# Patient Record
Sex: FEMALE | Race: OTHER | Employment: OTHER | URBAN - METROPOLITAN AREA
[De-identification: names, ages, dates, MRNs, and addresses within clinical notes are randomized per-mention and may not be internally consistent; named-entity substitution may affect disease eponyms.]

---

## 2017-08-03 ENCOUNTER — POST-OP (OUTPATIENT)
Dept: URBAN - METROPOLITAN AREA CLINIC 43 | Facility: CLINIC | Age: 72
End: 2017-08-03

## 2017-08-03 ENCOUNTER — PREPPED CHART (OUTPATIENT)
Dept: URBAN - METROPOLITAN AREA CLINIC 43 | Facility: CLINIC | Age: 72
End: 2017-08-03

## 2017-08-03 DIAGNOSIS — Z94.7: ICD-10-CM

## 2017-08-03 PROCEDURE — 99024 POSTOP FOLLOW-UP VISIT: CPT

## 2017-08-03 ASSESSMENT — TONOMETRY: OS_IOP_MMHG: 10

## 2017-08-03 ASSESSMENT — VISUAL ACUITY
OS_SC: 20/200
OD_SC: 20/80-2

## 2017-08-28 ENCOUNTER — POST-OP (OUTPATIENT)
Dept: URBAN - METROPOLITAN AREA CLINIC 43 | Facility: CLINIC | Age: 72
End: 2017-08-28

## 2017-08-28 DIAGNOSIS — H35.352: ICD-10-CM

## 2017-08-28 DIAGNOSIS — H35.30: ICD-10-CM

## 2017-08-28 DIAGNOSIS — Z98.890: ICD-10-CM

## 2017-08-28 DIAGNOSIS — H35.373: ICD-10-CM

## 2017-08-28 DIAGNOSIS — H43.813: ICD-10-CM

## 2017-08-28 PROCEDURE — 99024 POSTOP FOLLOW-UP VISIT: CPT

## 2017-08-28 PROCEDURE — 92134 CPTRZ OPH DX IMG PST SGM RTA: CPT

## 2017-08-28 RX ORDER — FLUOROMETHOLONE 1 MG/ML
1 SUSPENSION/ DROPS OPHTHALMIC ONCE A DAY
Start: 2017-08-28

## 2017-08-28 RX ORDER — NEPAFENAC 3 MG/ML
1 SUSPENSION/ DROPS OPHTHALMIC ONCE A DAY
Start: 2017-08-28

## 2017-08-28 ASSESSMENT — VISUAL ACUITY
OS_SC: 20/200
OD_SC: 20/80-1

## 2017-08-28 ASSESSMENT — TONOMETRY
OD_IOP_MMHG: 8
OS_IOP_MMHG: 8

## 2017-09-20 ENCOUNTER — NEW PATIENT COMPREHENSIVE (OUTPATIENT)
Dept: URBAN - METROPOLITAN AREA CLINIC 43 | Facility: CLINIC | Age: 72
End: 2017-09-20

## 2017-09-20 DIAGNOSIS — H35.3131: ICD-10-CM

## 2017-09-20 DIAGNOSIS — H43.813: ICD-10-CM

## 2017-09-20 DIAGNOSIS — H35.352: ICD-10-CM

## 2017-09-20 DIAGNOSIS — E11.9: ICD-10-CM

## 2017-09-20 DIAGNOSIS — H35.373: ICD-10-CM

## 2017-09-20 PROCEDURE — 9222550 BILAT EXTENDED OPHTHALMOSCOPY, FIRST

## 2017-09-20 PROCEDURE — 92287 ANT SGM IMG IR FLRSCN ANGRPH: CPT

## 2017-09-20 PROCEDURE — 2022F DILAT RTA XM EVC RTNOPTHY: CPT

## 2017-09-20 PROCEDURE — G8785 BP SCRN NO PERF AT INTERVAL: HCPCS

## 2017-09-20 PROCEDURE — 92014 COMPRE OPH EXAM EST PT 1/>: CPT

## 2017-09-20 PROCEDURE — G8427 DOCREV CUR MEDS BY ELIG CLIN: HCPCS

## 2017-09-20 PROCEDURE — 3072F LOW RISK FOR RETINOPATHY: CPT

## 2017-09-20 PROCEDURE — 92134 CPTRZ OPH DX IMG PST SGM RTA: CPT

## 2017-09-20 PROCEDURE — 4177F TALK PT/CRGVR RE AREDS PREV: CPT

## 2017-09-20 PROCEDURE — 1036F TOBACCO NON-USER: CPT

## 2017-09-20 PROCEDURE — 2019F DILATED MACUL EXAM DONE: CPT

## 2017-09-20 RX ORDER — DUREZOL 0.5 MG/ML
1 EMULSION OPHTHALMIC TWICE A DAY
Start: 2017-09-20

## 2017-09-20 ASSESSMENT — VISUAL ACUITY
OS_CC: J5
OS_SC: 20/100
OD_CC: J5
OS_PH: 20/70-1
OD_SC: 20/70-1

## 2017-10-20 ENCOUNTER — ESTABLISHED PATIENT (OUTPATIENT)
Dept: URBAN - METROPOLITAN AREA CLINIC 43 | Facility: CLINIC | Age: 72
End: 2017-10-20

## 2017-10-20 DIAGNOSIS — E11.9: ICD-10-CM

## 2017-10-20 DIAGNOSIS — H35.3131: ICD-10-CM

## 2017-10-20 DIAGNOSIS — Z79.4: ICD-10-CM

## 2017-10-20 DIAGNOSIS — H35.373: ICD-10-CM

## 2017-10-20 DIAGNOSIS — H35.352: ICD-10-CM

## 2017-10-20 DIAGNOSIS — H43.813: ICD-10-CM

## 2017-10-20 PROCEDURE — G8428 CUR MEDS NOT DOCUMENT: HCPCS

## 2017-10-20 PROCEDURE — 3072F LOW RISK FOR RETINOPATHY: CPT

## 2017-10-20 PROCEDURE — 2019F DILATED MACUL EXAM DONE: CPT

## 2017-10-20 PROCEDURE — 4177F TALK PT/CRGVR RE AREDS PREV: CPT

## 2017-10-20 PROCEDURE — G8785 BP SCRN NO PERF AT INTERVAL: HCPCS

## 2017-10-20 PROCEDURE — 2022F DILAT RTA XM EVC RTNOPTHY: CPT

## 2017-10-20 PROCEDURE — 92134 CPTRZ OPH DX IMG PST SGM RTA: CPT

## 2017-10-20 PROCEDURE — 1036F TOBACCO NON-USER: CPT

## 2017-10-20 PROCEDURE — 92012 INTRM OPH EXAM EST PATIENT: CPT

## 2017-10-20 ASSESSMENT — VISUAL ACUITY
OD_SC: 20/70-2
OD_PH: 20/50
OS_SC: 20/100+1

## 2017-10-20 ASSESSMENT — TONOMETRY
OS_IOP_MMHG: 18
OD_IOP_MMHG: 16

## 2017-11-02 ENCOUNTER — ESTABLISHED PATIENT (OUTPATIENT)
Dept: URBAN - METROPOLITAN AREA CLINIC 43 | Facility: CLINIC | Age: 72
End: 2017-11-02

## 2017-11-02 DIAGNOSIS — H35.352: ICD-10-CM

## 2017-11-02 PROCEDURE — 67028 INJECTION EYE DRUG: CPT

## 2017-11-02 ASSESSMENT — TONOMETRY: OS_IOP_MMHG: 14

## 2017-11-02 ASSESSMENT — VISUAL ACUITY
OD_SC: 20/80-1
OS_SC: 20/100+1

## 2017-11-29 ENCOUNTER — ESTABLISHED PATIENT (OUTPATIENT)
Dept: URBAN - METROPOLITAN AREA CLINIC 43 | Facility: CLINIC | Age: 72
End: 2017-11-29

## 2017-11-29 DIAGNOSIS — H40.052: ICD-10-CM

## 2017-11-29 DIAGNOSIS — H35.352: ICD-10-CM

## 2017-11-29 PROCEDURE — G8785 BP SCRN NO PERF AT INTERVAL: HCPCS

## 2017-11-29 PROCEDURE — G8427 DOCREV CUR MEDS BY ELIG CLIN: HCPCS

## 2017-11-29 PROCEDURE — 1036F TOBACCO NON-USER: CPT

## 2017-11-29 PROCEDURE — 92012 INTRM OPH EXAM EST PATIENT: CPT

## 2017-11-29 ASSESSMENT — TONOMETRY
OD_IOP_MMHG: 09
OS_IOP_MMHG: 12

## 2017-11-29 ASSESSMENT — VISUAL ACUITY
OD_SC: 20/100
OS_PH: 20/50-1
OD_PH: 20/60
OS_SC: 20/70

## 2017-12-13 ENCOUNTER — ESTABLISHED PATIENT (OUTPATIENT)
Dept: URBAN - METROPOLITAN AREA CLINIC 43 | Facility: CLINIC | Age: 72
End: 2017-12-13

## 2017-12-13 DIAGNOSIS — H40.052: ICD-10-CM

## 2017-12-13 DIAGNOSIS — H35.373: ICD-10-CM

## 2017-12-13 DIAGNOSIS — H35.3131: ICD-10-CM

## 2017-12-13 DIAGNOSIS — H43.813: ICD-10-CM

## 2017-12-13 DIAGNOSIS — H35.352: ICD-10-CM

## 2017-12-13 PROCEDURE — 4177F TALK PT/CRGVR RE AREDS PREV: CPT

## 2017-12-13 PROCEDURE — 9222650 BILAT EXTENDED OPHTHALMOSCOPY, F/U

## 2017-12-13 PROCEDURE — 92134 CPTRZ OPH DX IMG PST SGM RTA: CPT

## 2017-12-13 PROCEDURE — 92012 INTRM OPH EXAM EST PATIENT: CPT

## 2017-12-13 PROCEDURE — G8785 BP SCRN NO PERF AT INTERVAL: HCPCS

## 2017-12-13 PROCEDURE — G8428 CUR MEDS NOT DOCUMENT: HCPCS

## 2017-12-13 PROCEDURE — 2019F DILATED MACUL EXAM DONE: CPT

## 2017-12-13 PROCEDURE — 1036F TOBACCO NON-USER: CPT

## 2017-12-13 ASSESSMENT — VISUAL ACUITY
OS_SC: 20/60-2
OD_SC: 20/70
OS_PH: 20/50

## 2017-12-13 ASSESSMENT — TONOMETRY
OS_IOP_MMHG: 19
OD_IOP_MMHG: 12

## 2018-01-17 ENCOUNTER — ESTABLISHED PATIENT (OUTPATIENT)
Dept: URBAN - METROPOLITAN AREA CLINIC 43 | Facility: CLINIC | Age: 73
End: 2018-01-17

## 2018-01-17 DIAGNOSIS — H35.373: ICD-10-CM

## 2018-01-17 DIAGNOSIS — H35.3131: ICD-10-CM

## 2018-01-17 DIAGNOSIS — E11.9: ICD-10-CM

## 2018-01-17 DIAGNOSIS — H43.813: ICD-10-CM

## 2018-01-17 DIAGNOSIS — Z79.4: ICD-10-CM

## 2018-01-17 DIAGNOSIS — H35.352: ICD-10-CM

## 2018-01-17 PROCEDURE — G8785 BP SCRN NO PERF AT INTERVAL: HCPCS

## 2018-01-17 PROCEDURE — 2019F DILATED MACUL EXAM DONE: CPT

## 2018-01-17 PROCEDURE — 92134 CPTRZ OPH DX IMG PST SGM RTA: CPT

## 2018-01-17 PROCEDURE — G8428 CUR MEDS NOT DOCUMENT: HCPCS

## 2018-01-17 PROCEDURE — 92012 INTRM OPH EXAM EST PATIENT: CPT

## 2018-01-17 PROCEDURE — 3072F LOW RISK FOR RETINOPATHY: CPT

## 2018-01-17 PROCEDURE — 4177F TALK PT/CRGVR RE AREDS PREV: CPT

## 2018-01-17 PROCEDURE — 2022F DILAT RTA XM EVC RTNOPTHY: CPT

## 2018-01-17 PROCEDURE — 1036F TOBACCO NON-USER: CPT

## 2018-01-17 ASSESSMENT — VISUAL ACUITY
OD_PH: 20/60-2
OS_PH: 20/50-1
OS_SC: 20/60-2
OD_SC: 20/70-1

## 2018-01-17 ASSESSMENT — TONOMETRY
OD_IOP_MMHG: 11
OS_IOP_MMHG: 17

## 2018-02-28 ENCOUNTER — ESTABLISHED PATIENT (OUTPATIENT)
Dept: URBAN - METROPOLITAN AREA CLINIC 43 | Facility: CLINIC | Age: 73
End: 2018-02-28

## 2018-02-28 DIAGNOSIS — Z79.4: ICD-10-CM

## 2018-02-28 DIAGNOSIS — H35.3121: ICD-10-CM

## 2018-02-28 DIAGNOSIS — H35.723: ICD-10-CM

## 2018-02-28 DIAGNOSIS — H35.3111: ICD-10-CM

## 2018-02-28 DIAGNOSIS — H35.352: ICD-10-CM

## 2018-02-28 PROCEDURE — G8428 CUR MEDS NOT DOCUMENT: HCPCS

## 2018-02-28 PROCEDURE — 92012 INTRM OPH EXAM EST PATIENT: CPT

## 2018-02-28 PROCEDURE — 9222650 BILAT EXTENDED OPHTHALMOSCOPY, F/U

## 2018-02-28 PROCEDURE — G8785 BP SCRN NO PERF AT INTERVAL: HCPCS

## 2018-02-28 PROCEDURE — 1036F TOBACCO NON-USER: CPT

## 2018-02-28 PROCEDURE — 2019F DILATED MACUL EXAM DONE: CPT

## 2018-02-28 PROCEDURE — 92134 CPTRZ OPH DX IMG PST SGM RTA: CPT

## 2018-02-28 PROCEDURE — 4177F TALK PT/CRGVR RE AREDS PREV: CPT

## 2018-02-28 ASSESSMENT — TONOMETRY
OS_IOP_MMHG: 10
OD_IOP_MMHG: 12

## 2018-02-28 ASSESSMENT — VISUAL ACUITY
OD_SC: 20/100-1
OS_SC: 20/60
OD_PH: 20/70

## 2018-04-25 ENCOUNTER — ESTABLISHED PATIENT (OUTPATIENT)
Dept: URBAN - METROPOLITAN AREA CLINIC 43 | Facility: CLINIC | Age: 73
End: 2018-04-25

## 2018-04-25 DIAGNOSIS — H35.3121: ICD-10-CM

## 2018-04-25 DIAGNOSIS — H35.3111: ICD-10-CM

## 2018-04-25 DIAGNOSIS — E11.9: ICD-10-CM

## 2018-04-25 DIAGNOSIS — H43.813: ICD-10-CM

## 2018-04-25 DIAGNOSIS — H35.352: ICD-10-CM

## 2018-04-25 DIAGNOSIS — H35.371: ICD-10-CM

## 2018-04-25 DIAGNOSIS — H35.30: ICD-10-CM

## 2018-04-25 DIAGNOSIS — H35.723: ICD-10-CM

## 2018-04-25 DIAGNOSIS — H35.372: ICD-10-CM

## 2018-04-25 DIAGNOSIS — H35.363: ICD-10-CM

## 2018-04-25 PROCEDURE — 92012 INTRM OPH EXAM EST PATIENT: CPT

## 2018-04-25 PROCEDURE — 4177F TALK PT/CRGVR RE AREDS PREV: CPT

## 2018-04-25 PROCEDURE — G8785 BP SCRN NO PERF AT INTERVAL: HCPCS

## 2018-04-25 PROCEDURE — 92134 CPTRZ OPH DX IMG PST SGM RTA: CPT

## 2018-04-25 PROCEDURE — 9222650 BILAT EXTENDED OPHTHALMOSCOPY, F/U

## 2018-04-25 PROCEDURE — 2019F DILATED MACUL EXAM DONE: CPT

## 2018-04-25 PROCEDURE — G8428 CUR MEDS NOT DOCUMENT: HCPCS

## 2018-04-25 PROCEDURE — 1036F TOBACCO NON-USER: CPT

## 2018-04-25 PROCEDURE — 92242 FLUORESCEIN&ICG ANGIOGRAPHY: CPT

## 2018-04-25 RX ORDER — PREDNISOLONE ACETATE 10 MG/ML: 1 SUSPENSION/ DROPS OPHTHALMIC ONCE A DAY

## 2018-04-25 ASSESSMENT — VISUAL ACUITY
OS_SC: 20/60-2
OS_PH: 20/50
OD_SC: 20/70-2
OD_PH: 20/50-2

## 2018-04-25 ASSESSMENT — TONOMETRY
OS_IOP_MMHG: 10
OD_IOP_MMHG: 12

## 2019-01-21 ENCOUNTER — CORNEA CONSULT (OUTPATIENT)
Dept: URBAN - METROPOLITAN AREA CLINIC 43 | Facility: CLINIC | Age: 74
End: 2019-01-21

## 2019-01-21 VITALS
SYSTOLIC BLOOD PRESSURE: 129 MMHG | DIASTOLIC BLOOD PRESSURE: 73 MMHG | HEART RATE: 82 BPM | HEIGHT: 55 IN | RESPIRATION RATE: 14 BRPM

## 2019-01-21 DIAGNOSIS — H35.371: ICD-10-CM

## 2019-01-21 DIAGNOSIS — H35.372: ICD-10-CM

## 2019-01-21 DIAGNOSIS — H18.51: ICD-10-CM

## 2019-01-21 PROCEDURE — 1036F TOBACCO NON-USER: CPT

## 2019-01-21 PROCEDURE — G8427 DOCREV CUR MEDS BY ELIG CLIN: HCPCS

## 2019-01-21 PROCEDURE — 92134 CPTRZ OPH DX IMG PST SGM RTA: CPT

## 2019-01-21 PROCEDURE — 92015 DETERMINE REFRACTIVE STATE: CPT

## 2019-01-21 PROCEDURE — 92286 ANT SGM IMG I&R SPECLR MIC: CPT

## 2019-01-21 PROCEDURE — G8952 PRE-HTN/HTN, NO F/U, NOT GVN: HCPCS

## 2019-01-21 PROCEDURE — G9903 PT SCRN TBCO ID AS NON USER: HCPCS

## 2019-01-21 PROCEDURE — 92014 COMPRE OPH EXAM EST PT 1/>: CPT

## 2019-01-21 RX ORDER — PREDNISOLONE ACETATE 10 MG/ML: 1 SUSPENSION/ DROPS OPHTHALMIC

## 2019-01-21 RX ORDER — NEPAFENAC 3 MG/ML: 1 SUSPENSION/ DROPS OPHTHALMIC ONCE A DAY

## 2019-01-21 RX ORDER — MOXIFLOXACIN HYDROCHLORIDE 5 MG/ML: 1 SOLUTION/ DROPS OPHTHALMIC

## 2019-01-21 ASSESSMENT — VISUAL ACUITY
OS_CC: J3
OD_PH: 20/80
OD_SC: J12
OD_CC: J8
OS_PH: 20/50
OD_SC: 20/200
OS_SC: 20/70+1
OS_SC: J10

## 2019-01-21 ASSESSMENT — TONOMETRY
OD_IOP_MMHG: 12
OS_IOP_MMHG: 10

## 2019-01-25 ENCOUNTER — ESTABLISHED COMPREHENSIVE EXAM (OUTPATIENT)
Dept: URBAN - METROPOLITAN AREA CLINIC 43 | Facility: CLINIC | Age: 74
End: 2019-01-25

## 2019-01-25 DIAGNOSIS — H35.3111: ICD-10-CM

## 2019-01-25 DIAGNOSIS — H35.372: ICD-10-CM

## 2019-01-25 DIAGNOSIS — H35.723: ICD-10-CM

## 2019-01-25 DIAGNOSIS — Z79.4: ICD-10-CM

## 2019-01-25 DIAGNOSIS — H35.371: ICD-10-CM

## 2019-01-25 DIAGNOSIS — H35.3121: ICD-10-CM

## 2019-01-25 DIAGNOSIS — H35.352: ICD-10-CM

## 2019-01-25 PROCEDURE — 92134 CPTRZ OPH DX IMG PST SGM RTA: CPT

## 2019-01-25 PROCEDURE — 92242 FLUORESCEIN&ICG ANGIOGRAPHY: CPT

## 2019-01-25 PROCEDURE — 9222650 BILAT EXTENDED OPHTHALMOSCOPY, F/U

## 2019-01-25 PROCEDURE — G8785 BP SCRN NO PERF AT INTERVAL: HCPCS

## 2019-01-25 PROCEDURE — 2019F DILATED MACUL EXAM DONE: CPT

## 2019-01-25 PROCEDURE — G9903 PT SCRN TBCO ID AS NON USER: HCPCS

## 2019-01-25 PROCEDURE — G8428 CUR MEDS NOT DOCUMENT: HCPCS

## 2019-01-25 PROCEDURE — 4177F TALK PT/CRGVR RE AREDS PREV: CPT

## 2019-01-25 PROCEDURE — 92014 COMPRE OPH EXAM EST PT 1/>: CPT

## 2019-01-25 PROCEDURE — 1036F TOBACCO NON-USER: CPT

## 2019-01-25 ASSESSMENT — VISUAL ACUITY
OD_SC: 20/200
OS_PH: 20/50
OS_SC: 20/60

## 2019-01-25 ASSESSMENT — TONOMETRY
OD_IOP_MMHG: 12
OS_IOP_MMHG: 13

## 2019-03-27 ENCOUNTER — SURGERY/PROCEDURE (OUTPATIENT)
Dept: URBAN - METROPOLITAN AREA CLINIC 43 | Facility: CLINIC | Age: 74
End: 2019-03-27

## 2019-03-27 ENCOUNTER — PRE-OP/H&P (OUTPATIENT)
Dept: URBAN - METROPOLITAN AREA SURGERY 14 | Facility: SURGERY | Age: 74
End: 2019-03-27

## 2019-03-27 DIAGNOSIS — H35.723: ICD-10-CM

## 2019-03-27 DIAGNOSIS — H35.352: ICD-10-CM

## 2019-03-27 DIAGNOSIS — H18.51: ICD-10-CM

## 2019-03-27 DIAGNOSIS — H35.3121: ICD-10-CM

## 2019-03-27 DIAGNOSIS — H35.3111: ICD-10-CM

## 2019-03-27 DIAGNOSIS — H35.372: ICD-10-CM

## 2019-03-27 DIAGNOSIS — H35.363: ICD-10-CM

## 2019-03-27 DIAGNOSIS — Z79.4: ICD-10-CM

## 2019-03-27 DIAGNOSIS — H40.052: ICD-10-CM

## 2019-03-27 DIAGNOSIS — H35.30: ICD-10-CM

## 2019-03-27 DIAGNOSIS — E11.9: ICD-10-CM

## 2019-03-27 DIAGNOSIS — H43.813: ICD-10-CM

## 2019-03-27 DIAGNOSIS — H35.371: ICD-10-CM

## 2019-03-27 PROCEDURE — 65756 CORNEAL TRNSPL ENDOTHELIAL: CPT

## 2019-03-27 PROCEDURE — 99499 UNLISTED E&M SERVICE: CPT

## 2019-03-28 ENCOUNTER — POST-OP (OUTPATIENT)
Dept: URBAN - METROPOLITAN AREA CLINIC 43 | Facility: CLINIC | Age: 74
End: 2019-03-28

## 2019-03-28 DIAGNOSIS — Z98.890: ICD-10-CM

## 2019-03-28 PROCEDURE — 99024 POSTOP FOLLOW-UP VISIT: CPT

## 2019-03-28 ASSESSMENT — VISUAL ACUITY: OD_SC: 2/200

## 2019-03-28 ASSESSMENT — TONOMETRY: OD_IOP_MMHG: 10

## 2019-04-02 ENCOUNTER — POST-OP (OUTPATIENT)
Dept: URBAN - METROPOLITAN AREA CLINIC 43 | Facility: CLINIC | Age: 74
End: 2019-04-02

## 2019-04-02 DIAGNOSIS — Z98.890: ICD-10-CM

## 2019-04-02 PROCEDURE — 99024 POSTOP FOLLOW-UP VISIT: CPT

## 2019-04-02 ASSESSMENT — VISUAL ACUITY
OD_PH: 20/200
OD_SC: 20/400
OS_SC: 20/60

## 2019-04-02 ASSESSMENT — TONOMETRY: OD_IOP_MMHG: 12

## 2019-04-16 ENCOUNTER — POST-OP (OUTPATIENT)
Dept: URBAN - METROPOLITAN AREA CLINIC 43 | Facility: CLINIC | Age: 74
End: 2019-04-16

## 2019-04-16 DIAGNOSIS — Z98.890: ICD-10-CM

## 2019-04-16 PROCEDURE — 99024 POSTOP FOLLOW-UP VISIT: CPT

## 2019-04-16 ASSESSMENT — VISUAL ACUITY
OD_SC: 20/400
OD_SC: J12
OS_SC: J10
OD_PH: 20/80
OS_SC: 20/60

## 2019-04-16 ASSESSMENT — TONOMETRY
OS_IOP_MMHG: 14
OD_IOP_MMHG: 14

## 2019-04-22 ENCOUNTER — POST-OP (OUTPATIENT)
Dept: URBAN - METROPOLITAN AREA CLINIC 43 | Facility: CLINIC | Age: 74
End: 2019-04-22

## 2019-04-22 DIAGNOSIS — H18.51: ICD-10-CM

## 2019-04-22 DIAGNOSIS — Z98.890: ICD-10-CM

## 2019-04-22 PROCEDURE — 99024 POSTOP FOLLOW-UP VISIT: CPT

## 2019-04-22 PROCEDURE — 92286 ANT SGM IMG I&R SPECLR MIC: CPT

## 2019-04-22 ASSESSMENT — TONOMETRY
OS_IOP_MMHG: 16
OD_IOP_MMHG: 15

## 2019-04-22 ASSESSMENT — VISUAL ACUITY
OD_SC: 20/200
OS_SC: 20/60

## 2019-07-01 ENCOUNTER — FOLLOW UP (OUTPATIENT)
Dept: URBAN - METROPOLITAN AREA CLINIC 43 | Facility: CLINIC | Age: 74
End: 2019-07-01

## 2019-07-01 DIAGNOSIS — H35.3111: ICD-10-CM

## 2019-07-01 DIAGNOSIS — Z98.890: ICD-10-CM

## 2019-07-01 DIAGNOSIS — H18.51: ICD-10-CM

## 2019-07-01 PROCEDURE — 92012 INTRM OPH EXAM EST PATIENT: CPT

## 2019-07-01 PROCEDURE — 92286 ANT SGM IMG I&R SPECLR MIC: CPT

## 2019-07-01 RX ORDER — SODIUM CHLORIDE 50 MG/ML: 1 SOLUTION OPHTHALMIC

## 2019-07-01 RX ORDER — PREDNISOLONE ACETATE 10 MG/ML: 1 SUSPENSION/ DROPS OPHTHALMIC

## 2019-07-01 RX ORDER — NEPAFENAC 3 MG/ML: 1 SUSPENSION/ DROPS OPHTHALMIC ONCE A DAY

## 2019-07-01 RX ORDER — SODIUM CHLORIDE 50 MG/G: OINTMENT OPHTHALMIC EVERY EVENING

## 2019-07-01 RX ORDER — MOXIFLOXACIN OPHTHALMIC 5 MG/ML: 1 SOLUTION/ DROPS OPHTHALMIC

## 2019-07-01 ASSESSMENT — TONOMETRY
OS_IOP_MMHG: 11
OD_IOP_MMHG: 12

## 2019-07-01 ASSESSMENT — VISUAL ACUITY
OD_SC: 20/80-1
OS_SC: 20/50
OD_PH: 20/70-1

## 2020-01-08 ENCOUNTER — EST. PATIENT EMERGENCY (OUTPATIENT)
Dept: URBAN - METROPOLITAN AREA CLINIC 43 | Facility: CLINIC | Age: 75
End: 2020-01-08

## 2020-01-08 DIAGNOSIS — Z98.890: ICD-10-CM

## 2020-01-08 DIAGNOSIS — H02.886: ICD-10-CM

## 2020-01-08 DIAGNOSIS — H02.883: ICD-10-CM

## 2020-01-08 PROCEDURE — 92012 INTRM OPH EXAM EST PATIENT: CPT

## 2020-01-08 ASSESSMENT — VISUAL ACUITY
OS_PH: 20/50+1
OD_PH: 20/40
OD_SC: 20/50
OS_SC: 20/60+1

## 2020-01-08 ASSESSMENT — TONOMETRY
OS_IOP_MMHG: 13
OD_IOP_MMHG: 13

## 2020-07-08 ENCOUNTER — ESTABLISHED COMPREHENSIVE EXAM (OUTPATIENT)
Dept: URBAN - METROPOLITAN AREA CLINIC 43 | Facility: CLINIC | Age: 75
End: 2020-07-08

## 2020-07-08 DIAGNOSIS — H02.883: ICD-10-CM

## 2020-07-08 DIAGNOSIS — H35.372: ICD-10-CM

## 2020-07-08 DIAGNOSIS — H02.886: ICD-10-CM

## 2020-07-08 DIAGNOSIS — H35.371: ICD-10-CM

## 2020-07-08 DIAGNOSIS — E11.9: ICD-10-CM

## 2020-07-08 PROCEDURE — 92015 DETERMINE REFRACTIVE STATE: CPT

## 2020-07-08 PROCEDURE — 92014 COMPRE OPH EXAM EST PT 1/>: CPT

## 2020-07-08 PROCEDURE — 92134 CPTRZ OPH DX IMG PST SGM RTA: CPT

## 2020-07-08 RX ORDER — DOXYCYCLINE 50 MG/1
1 CAPSULE ORAL ONCE A DAY
Start: 2020-07-08 | End: 2020-08-08

## 2020-07-08 RX ORDER — DUREZOL 0.5 MG/ML: 1 EMULSION OPHTHALMIC ONCE A DAY

## 2020-07-08 ASSESSMENT — VISUAL ACUITY
OD_CC: J1
OS_SC: J12
OD_SC: J12
OS_SC: 20/60-2
OS_PH: 20/60-1
OD_SC: 20/50-1
OD_PH: 20/50-1
OS_CC: J2

## 2020-07-08 ASSESSMENT — TONOMETRY
OS_IOP_MMHG: 12
OD_IOP_MMHG: 12

## 2020-08-05 ENCOUNTER — FOLLOW UP (OUTPATIENT)
Dept: URBAN - METROPOLITAN AREA CLINIC 43 | Facility: CLINIC | Age: 75
End: 2020-08-05

## 2020-08-05 DIAGNOSIS — H02.883: ICD-10-CM

## 2020-08-05 DIAGNOSIS — H02.886: ICD-10-CM

## 2020-08-05 DIAGNOSIS — H04.123: ICD-10-CM

## 2020-08-05 PROCEDURE — 92012 INTRM OPH EXAM EST PATIENT: CPT

## 2020-08-05 ASSESSMENT — TONOMETRY
OS_IOP_MMHG: 12
OD_IOP_MMHG: 12

## 2020-08-05 ASSESSMENT — VISUAL ACUITY
OD_SC: 20/50
OS_SC: 20/60-2

## 2020-12-31 NOTE — PATIENT DISCUSSION
THE PATIENT WOULD LIKE TO BE LESS DEPENDENT ON GLASSES FOR DISTANCE AND NEAR. DISC ALL SACRIFICES WITH MONOVA, MULTIFOCAL, AND EDOF LENSES INCLUDING HALOS/GLARE. DISC THAT THERE IS NO GUARANTEE THAT SHE WOULD BE COMPLETELY FREE OF GLS AFTER SX, BUT LESS DEPENDENT ON THEM FOR DIST/NEAR VISION.

## 2021-01-05 NOTE — PATIENT DISCUSSION
CATARACT, OU - EQUALLY VISUALLY SIGNIFICANT AND PATIENT BOTHERED BY GLARE AND THE QUALITY OF HER VISION. SCHEDULE SX OD (DOMINATE EYE) THEN LATER IN OS IF VISUAL SYMPTOMS PERSIST.  GLS RX GIVEN TO FILL IF DESIRES IN THE EVENT PT DOES NOT PROCEED W/ SX.

## 2021-01-05 NOTE — PATIENT DISCUSSION
RECOMMEND FRANK WITH MINI-MONOVISION AND WILL ADJUST AFTER SURGERY.  WOULD HAVE GOAL OF +0.25 OD AND -0.75 OS

## 2021-01-05 NOTE — PATIENT DISCUSSION
DISCUSSED BENEFIT FROM FRANK DUE TO FLEXIBILITY OF ADJUSTING THE IOL UP TO 3 TIMES AFTER SURGERY. PATIENT UNDERSTANDS THE NEED FOR FULL TIME UV PROTECTIVE GLASSES UNTIL 24 HOURS AFTER THE IOL IS LOCKED.

## 2021-01-05 NOTE — PATIENT DISCUSSION
The RxSight Light Adjustable Lens (RxLAL) is similar to other intraocular lenses (IOLs) that can be implanted in the eye to replace the natural lens that is removed during cataract surgery. While all IOLs improve vision after cataract surgery, most patients will require glasses (or contact lenses) to improve their vision to the level required for driving or reading. The RxLAL reduces the need for glasses or contact lenses by being able to change its focusing power after it is implanted in the eye. The focusing power of the RxLAL is adjusted by specific patterns of ultraviolet (UV) light produced by the RxSight Light Delivery Device (LDD), an instrument that the doctor uses in the office beginning 2-3 weeks after cataract surgery. Up to three light adjustment treatments can be performed to improve the patients vision, with a separation of 3 days between treatments. When the patient and doctor are satisfied with the vision, the same LDD is used to lockin the RxLAL and make the prescription permanent. From immediately after surgery until 24 hours after the completion of the lockin treatment, it was discussed that the need to protect the RxLAL from UV light in the environment by wearing FRANK specific protective eyewear provided during all waking hours.

## 2021-01-05 NOTE — PATIENT DISCUSSION
DISCUSSED ASYMMETRIC ASTIGMATISM WITH REPEAT TESTING TODAY.  WOULD NOT RECOMMEND PANOPTIX OR VIVITY DUE TO POTENTIALLY NEEDING ENHANCEMENT

## 2021-02-03 ENCOUNTER — FOLLOW UP (OUTPATIENT)
Dept: URBAN - METROPOLITAN AREA CLINIC 43 | Facility: CLINIC | Age: 76
End: 2021-02-03

## 2021-02-03 DIAGNOSIS — H02.886: ICD-10-CM

## 2021-02-03 DIAGNOSIS — H02.883: ICD-10-CM

## 2021-02-03 DIAGNOSIS — H04.543: ICD-10-CM

## 2021-02-03 DIAGNOSIS — H04.123: ICD-10-CM

## 2021-02-03 PROCEDURE — 99212 OFFICE O/P EST SF 10 MIN: CPT

## 2021-02-03 RX ORDER — LOTEPREDNOL ETABONATE 5 MG/ML: 1 SUSPENSION/ DROPS OPHTHALMIC EVERY MORNING

## 2021-02-03 ASSESSMENT — TONOMETRY
OS_IOP_MMHG: 12
OD_IOP_MMHG: 12

## 2021-02-03 ASSESSMENT — VISUAL ACUITY
OD_SC: 20/40-2
OS_SC: 20/70
OD_PH: 20/40

## 2021-02-03 NOTE — PATIENT DISCUSSION
***This patient had refractive cataract surgery performed. An IOL was placed to achieve a target refraction of -0.75 (which should provide them with satisfactory vision with the possible aid of glasses/contact lenses prn). ***

## 2021-02-03 NOTE — PATIENT DISCUSSION
Continue: prednisol ace-gatiflox-bromfen (prednisol ace-gatiflox-bromfen): drops,suspension: 1-0.5-0.075% 1 drop three times a day into affected eye 01-

## 2021-02-09 ENCOUNTER — ESTABLISHED PATIENT (OUTPATIENT)
Dept: URBAN - METROPOLITAN AREA CLINIC 44 | Facility: CLINIC | Age: 76
End: 2021-02-09

## 2021-02-09 DIAGNOSIS — H04.203: ICD-10-CM

## 2021-02-09 DIAGNOSIS — H04.123: ICD-10-CM

## 2021-02-09 PROCEDURE — 99213 OFFICE O/P EST LOW 20 MIN: CPT

## 2021-02-09 PROCEDURE — 6884050 EXPLORE/IRRIGATE TEAR DUCTS BILATERAL

## 2021-02-09 ASSESSMENT — VISUAL ACUITY
OS_SC: 20/70
OD_SC: 20/40-2

## 2021-02-09 NOTE — PATIENT DISCUSSION
S/P PCIOL OU (FRANK) - DOING WELL. PT WORKS IN FRONT OF COMPUTER QDAILY. TRIAL FRAMED MV IN OFFICE W/ PATIENT; HAPPY W/ DISTANCE AND COMPUTER CLARITY.

## 2021-02-17 NOTE — PATIENT DISCUSSION
New Prescription: Pred Forte (prednisolone acetate): drops,suspension: 1% 1 drop four times a day as directed into right eye 02-

## 2021-02-22 NOTE — PATIENT DISCUSSION
Continue: Pred Forte (prednisolone acetate): drops,suspension: 1% 1 drop four times a day as directed into right eye 02-

## 2021-03-01 NOTE — PATIENT DISCUSSION
- PT REPORTS RECENT COLD SORE ALONG HER MOUTH 2 WEEKS AGO; SUSPECT HSV VS. POST HERPETIC NEURALGIA DDX.

## 2021-03-01 NOTE — PATIENT DISCUSSION
- REPORTS CONSTANT 5/10 PAIN SINCE SX; PAIN DID NOT IMPROVE S/P INSERTION OF ANESTHETIC OR CYCLOPLEGIA OD/OS.

## 2021-03-01 NOTE — PATIENT DISCUSSION
- (-) ANTERIOR OR POSTERIOR INFLAMMATION; REBOUND IRITIS S/P SX RESOLVED W/ DUREZOL 1 GTT QID OU. [facility] [place] [facility]. DENIES S/S OF [facility].

## 2021-03-03 ENCOUNTER — FOLLOW UP (OUTPATIENT)
Dept: URBAN - METROPOLITAN AREA CLINIC 43 | Facility: CLINIC | Age: 76
End: 2021-03-03

## 2021-03-03 DIAGNOSIS — H04.123: ICD-10-CM

## 2021-03-03 DIAGNOSIS — H04.203: ICD-10-CM

## 2021-03-03 DIAGNOSIS — H02.88A: ICD-10-CM

## 2021-03-03 DIAGNOSIS — H02.88B: ICD-10-CM

## 2021-03-03 PROCEDURE — 92012 INTRM OPH EXAM EST PATIENT: CPT

## 2021-03-03 RX ORDER — AZITHROMYCIN DIHYDRATE 250 MG/1
1 TABLET, FILM COATED ORAL ONCE A DAY
Start: 2021-03-03

## 2021-03-03 RX ORDER — BEPOTASTINE BESILATE 15 MG/ML
1 SOLUTION/ DROPS OPHTHALMIC TWICE A DAY
Start: 2021-03-03

## 2021-03-03 ASSESSMENT — VISUAL ACUITY
OS_PH: 20/40-2
OS_SC: 20/60
OD_SC: 20/40

## 2021-03-03 ASSESSMENT — TONOMETRY
OS_IOP_MMHG: 12
OD_IOP_MMHG: 12

## 2021-03-15 NOTE — PATIENT DISCUSSION
TRIAL FRAMED OS -0.75 MONOVISION AND PATIENT NOTICES DISTANCE BLUR OU BUT UNDERSTANDS COMPROMISE AND LIKES THE NEAR BETTER.

## 2021-03-22 ENCOUNTER — FOLLOW UP (OUTPATIENT)
Dept: URBAN - METROPOLITAN AREA CLINIC 43 | Facility: CLINIC | Age: 76
End: 2021-03-22

## 2021-03-22 DIAGNOSIS — H02.88B: ICD-10-CM

## 2021-03-22 DIAGNOSIS — H02.88A: ICD-10-CM

## 2021-03-22 DIAGNOSIS — H04.203: ICD-10-CM

## 2021-03-22 DIAGNOSIS — H10.013: ICD-10-CM

## 2021-03-22 DIAGNOSIS — H04.123: ICD-10-CM

## 2021-03-22 PROCEDURE — 99212 OFFICE O/P EST SF 10 MIN: CPT

## 2021-03-22 ASSESSMENT — VISUAL ACUITY
OS_PH: 20/50
OS_SC: 20/60
OD_SC: 20/40

## 2021-03-22 ASSESSMENT — TONOMETRY
OS_IOP_MMHG: 10
OD_IOP_MMHG: 12

## 2021-03-24 NOTE — PATIENT DISCUSSION
LONG DISCUSSION OF POTENTIAL NON ADAPTATION TO MONOVISION VS PATIENT NOTICING HER VISION IN THE NEAR EYE IS BLURRY AT THE DISTANCE WHEN SWITCHING FROM EYE TO EYE. GIVEN OPTION OF LOCKING IN TODAY VS HOLDING OFF ONE MORE WEEK TO ENSURE THAT PATIENT IS HAPPY WITH VISION. IF PATIENT IS HAPPY WITH VISION WILL LOCK IN . IF PATIENT IS NOT ADAPTING TO THE MONOVISION WILL SET THE OS FOR DISTANCE WITH THIRD TREATMENT. PATIENT UNDERSTANDS THAT IF WE SET BOTH EYES FOR DISTANCE SHE WILL NEED READING GLASSES FOR ALL INTERMEDIATE AND NEAR VISION.

## 2021-04-12 NOTE — PATIENT DISCUSSION
Stopped Today: prednisol ace-gatiflox-bromfen (prednisol ace-gatiflox-bromfen): drops,suspension: 1-0.5-0.075% 1 drop three times a day into affected eye 01-

## 2021-07-23 ENCOUNTER — ESTABLISHED COMPREHENSIVE EXAM (OUTPATIENT)
Dept: URBAN - METROPOLITAN AREA CLINIC 43 | Facility: CLINIC | Age: 76
End: 2021-07-23

## 2021-07-23 DIAGNOSIS — E11.9: ICD-10-CM

## 2021-07-23 DIAGNOSIS — H35.371: ICD-10-CM

## 2021-07-23 DIAGNOSIS — H35.372: ICD-10-CM

## 2021-07-23 DIAGNOSIS — Z79.4: ICD-10-CM

## 2021-07-23 DIAGNOSIS — H35.3131: ICD-10-CM

## 2021-07-23 PROCEDURE — 92015 DETERMINE REFRACTIVE STATE: CPT

## 2021-07-23 PROCEDURE — 92014 COMPRE OPH EXAM EST PT 1/>: CPT

## 2021-07-23 ASSESSMENT — VISUAL ACUITY
OD_SC: J12
OD_PH: 20/50-1
OD_SC: 20/60-2
OS_SC: J12
OS_SC: 20/60-1
OS_CC: J3
OD_CC: J3
OS_PH: 20/50-2

## 2021-07-23 ASSESSMENT — TONOMETRY
OD_IOP_MMHG: 10
OS_IOP_MMHG: 11

## 2022-07-25 ENCOUNTER — COMPREHENSIVE EXAM (OUTPATIENT)
Dept: URBAN - METROPOLITAN AREA CLINIC 43 | Facility: CLINIC | Age: 77
End: 2022-07-25

## 2022-07-25 DIAGNOSIS — H04.123: ICD-10-CM

## 2022-07-25 DIAGNOSIS — Z79.4: ICD-10-CM

## 2022-07-25 DIAGNOSIS — H35.373: ICD-10-CM

## 2022-07-25 DIAGNOSIS — H35.3131: ICD-10-CM

## 2022-07-25 DIAGNOSIS — Z96.1: ICD-10-CM

## 2022-07-25 DIAGNOSIS — E11.9: ICD-10-CM

## 2022-07-25 PROCEDURE — 92014 COMPRE OPH EXAM EST PT 1/>: CPT

## 2022-07-25 PROCEDURE — 92015 DETERMINE REFRACTIVE STATE: CPT

## 2022-07-25 PROCEDURE — 92134 CPTRZ OPH DX IMG PST SGM RTA: CPT

## 2022-07-25 ASSESSMENT — VISUAL ACUITY
OD_SC: 20/40-2
OD_SC: J12
OS_SC: 20/40-1
OS_CC: J3-
OD_CC: J3-
OS_SC: J12

## 2022-07-25 ASSESSMENT — TONOMETRY
OS_IOP_MMHG: 11
OD_IOP_MMHG: 10